# Patient Record
(demographics unavailable — no encounter records)

---

## 2025-01-26 NOTE — PHYSICAL EXAM
[Average] : average [Cooperative] : cooperative [Euthymic] : euthymic [Full] : full [Clear] : clear [Linear/Goal Directed] : linear/goal directed [None] : none [None Reported] : none reported [Above average] : above average [WNL] : within normal limits [Positive interaction] : positive interaction [Unremarkable/age appropriate] : unremarkable/age appropriate

## 2025-01-26 NOTE — REASON FOR VISIT
[Self-Referred] : Self-Referred [Patient] : Patient [Collateral - Name/Contact Info/Relationship:___] : Collateral: [unfilled] [FreeTextEntry1] : isolating, therapy referral  [FreeTextEntry2] : isolating, therapy referral

## 2025-01-26 NOTE — REASON FOR VISIT
[Self-Referred] : Self-Referred [Patient] : Patient [Collateral - Name/Contact Info/Relationship:___] : Collateral: [unfilled] [FreeTextEntry2] : isolating, therapy referral  [FreeTextEntry1] : isolating, therapy referral

## 2025-01-26 NOTE — SOCIAL HISTORY
[FreeTextEntry1] : lives with mom Mansi , dad Florecita; brother 12 in [place] in a house, has own bedroom. mom- retail communicates sales  dad - insurance company   ELANA, chinese, does not follow any [employer].   Enjoys playing with friends, haninging out, multiplayer video games

## 2025-01-26 NOTE — HISTORY OF PRESENT ILLNESS
[FreeTextEntry1] : Patient is a 15 yo male, domiciled with bio parents and younger brother  , currently enrolled at [employer] 9th grade  regular education, with no hx of academic accommodation  services, currently not in outpatient treatment, no prior psychiatric hospitalization, no self-injury or suicide attempts, no aggression/violence, no substance use, no legal issues, no CPS involvement, no trauma/abuse, no sig PMH, presenting today with mother for concerns of depression.  Mother states child was in usual state of health, known to be shy nature always, though doing well; Then recently during winter break family took a vacation to [facility] with extended family and family friends, however teen did not want to participate with them in group activities; Mother became concerned when her friends commented that he may be depressed; Other behaviors of not wanting to eat with others, but would ask mom to bring him food.  On returning home from vacation, the first week he seemed to himself as well, though attending to [facility]; Mother states she asked child to explain his decision not to participate in the group and he stated he doesn't like the crowd of having to speak with people for small talk.  Mother states school does not have any concerns and he does have his close friends; Mother thinks from the time she made the appt a few weeks ago to now, he also had the flu and has recovered;  his behavior seems to have improved, though he is still shy; Mother did not have any safety concerns.  Interview with patient corroborates above; states he doesn't think he is depressed or anxious; Vacation time he preferred to play video games with his friends which he doesn't get to do as much when school is in session; adds he doesn't like hangingout with the people the family vacationed with; He was tired the latter part of the trip, however was found to have the flu as well; He thinks he's quiet, but now as much around his friends; He does like his school however does not like the commute as he needs to wake up at 5:30 to get the bus on time; States lately he is busy during the day with school and afterschool clubs, that by the time he gets home he is tired in the evening; He thinks he's eating well; He is in bed on a school night by 11pm. Denies any hx of self harm or SIIP.  Denies ry/hypomania/ED/trauma/OCD/substance/psychosis hx or symptoms. Mother is interested in a referral for therapy, pt states he'll try if his mom thinks he needs it, though he is not interested.  [FreeTextEntry2] : none  [FreeTextEntry3] : none

## 2025-01-26 NOTE — PLAN
[FreeTextEntry4] : PLAN: -psychoeducation about diagnosis and treatment modalities, alternatives to recommended treatment, risk Vs benefits of treatment and no treatment and alternative treatments. - referral for supportive therapy  -Lab/other tests: appreciate coordination of care with PMD, TSH screen if not already done.  -Medication: Consideration for SSRI Prozac if no improvement with psychotherapy , pt/parent are not interested in med mgmt.   -Safety: Educated patient of importance of remaining abstinent from drugs and alcohol; Emergency procedures were discussed: pt. educated to call 911 or go to nearest ER for worsening of symptoms/suicidal/homicidal ideation. -Patient given opportunity to ask questions and their questions were answered and they expressed understanding and agreement with above plan. -Follow up: RTC  as needed if interested in med mgmt.     I spent a total of 60 minutes for today's visit in evaluating and treating the patient as per above, including: preparing for patient's appointment (review of prior documents); obtaining and reviewing separately obtained history; performing a medically necessary exam/evaluation;  communicating/counseling/educating the patient/family/caregiver;  referring to other health care professionals; documenting clinical information in the EHR

## 2025-01-26 NOTE — PAST MEDICAL HISTORY
[FreeTextEntry1] : Past Medical history Major medical problem: denies OTC medications: denies TBI: denies Seizures: denies Migraine HA: denies Surgery: none  Developmental History: Pre/juanito- problems: Unremarkable per patient knowledge Developmental milestones: unremarkable per patient knowledge Infections: none per patient knowledge Febrile seizures: none per patient knowledge

## 2025-01-26 NOTE — HISTORY OF PRESENT ILLNESS
[FreeTextEntry1] : Patient is a 13 yo male, domiciled with bio parents and younger brother  , currently enrolled at [employer] 9th grade  regular education, with no hx of academic accommodation  services, currently not in outpatient treatment, no prior psychiatric hospitalization, no self-injury or suicide attempts, no aggression/violence, no substance use, no legal issues, no CPS involvement, no trauma/abuse, no sig PMH, presenting today with mother for concerns of depression.  Mother states child was in usual state of health, known to be shy nature always, though doing well; Then recently during winter break family took a vacation to [facility] with extended family and family friends, however teen did not want to participate with them in group activities; Mother became concerned when her friends commented that he may be depressed; Other behaviors of not wanting to eat with others, but would ask mom to bring him food.  On returning home from vacation, the first week he seemed to himself as well, though attending to ADL; Mother states she asked child to explain his decision not to participate in the group and he stated he doesn't like the crowd of having to speak with people for small talk.  Mother states school does not have any concerns and he does have his close friends; Mother thinks from the time she made the appt a few weeks ago to now, he also had the flu and has recovered;  his behavior seems to have improved, though he is still shy; Mother did not have any safety concerns.  Interview with patient corroborates above; states he doesn't think he is depressed or anxious; Vacation time he preferred to play video games with his friends which he doesn't get to do as much when school is in session; adds he doesn't like hangingout with the people the family vacationed with; He was tired the latter part of the trip, however was found to have the flu as well; He thinks he's quiet, but now as much around his friends; He does like his school however does not like the commute as he needs to wake up at 5:30 to get the bus on time; States lately he is busy during the day with school and afterschool clubs, that by the time he gets home he is tired in the evening; He thinks he's eating well; He is in bed on a school night by 11pm. Denies any hx of self harm or SIIP.  Denies ry/hypomania/ED/trauma/OCD/substance/psychosis hx or symptoms. Mother is interested in a referral for therapy, pt states he'll try if his mom thinks he needs it, though he is not interested.  [FreeTextEntry2] : none  [FreeTextEntry3] : none

## 2025-01-26 NOTE — DISCUSSION/SUMMARY
[FreeTextEntry1] : 13 yo male with acute adjustment with depressed mood; Protective factors of supportive family and friends, looks to treatment favorably, as such with treatment adherence, prognosis is good.  [Low acute suicide risk] : Low acute suicide risk [No] : No [Not clinically indicated] : Safety Plan completed/updated (for individuals at risk): Not clinically indicated

## 2025-01-26 NOTE — RISK ASSESSMENT
[Clinical Interview] : Clinical Interview [Collateral Sources] : Collateral Sources [No] : No [Supportive social network of family or friends] : supportive social network of family or friends [Identifies reasons for living] : identifies reasons for living [Ability to cope with stress] : ability to cope with stress [Positive therapeutic relationships] : positive therapeutic relationships [Responsibility to children, family, or others] : responsibility to children, family, or others [Frustration tolerance] : frustration tolerance [Engaged in work or school] : engaged in work or school [Fear of death/actual act of killing self] : fear of death or the actual act of killing self

## 2025-01-26 NOTE — SOCIAL HISTORY
[FreeTextEntry1] : lives with mom Mansi , dad Florecita; brother 12 in [place] in a house, has own bedroom. mom- retail communicates sales  dad - insurance company   ELANA, chinese, does not follow any [employer].   Enjoys playing with friends, haninging out, multiplayer video games